# Patient Record
Sex: FEMALE | Race: WHITE | NOT HISPANIC OR LATINO
[De-identification: names, ages, dates, MRNs, and addresses within clinical notes are randomized per-mention and may not be internally consistent; named-entity substitution may affect disease eponyms.]

---

## 2019-09-05 ENCOUNTER — APPOINTMENT (OUTPATIENT)
Dept: MAMMOGRAPHY | Facility: CLINIC | Age: 49
End: 2019-09-05

## 2021-07-31 ENCOUNTER — APPOINTMENT (OUTPATIENT)
Dept: RADIOLOGY | Facility: CLINIC | Age: 51
End: 2021-07-31

## 2021-07-31 ENCOUNTER — APPOINTMENT (OUTPATIENT)
Dept: MAMMOGRAPHY | Facility: CLINIC | Age: 51
End: 2021-07-31
Payer: COMMERCIAL

## 2021-07-31 PROCEDURE — 77063 BREAST TOMOSYNTHESIS BI: CPT

## 2021-07-31 PROCEDURE — 77067 SCR MAMMO BI INCL CAD: CPT

## 2021-07-31 PROCEDURE — 77080 DXA BONE DENSITY AXIAL: CPT

## 2023-08-31 ENCOUNTER — APPOINTMENT (OUTPATIENT)
Dept: MAMMOGRAPHY | Facility: CLINIC | Age: 53
End: 2023-08-31
Payer: COMMERCIAL

## 2023-08-31 ENCOUNTER — APPOINTMENT (OUTPATIENT)
Dept: RADIOLOGY | Facility: CLINIC | Age: 53
End: 2023-08-31

## 2023-08-31 PROCEDURE — 77080 DXA BONE DENSITY AXIAL: CPT

## 2023-08-31 PROCEDURE — 77063 BREAST TOMOSYNTHESIS BI: CPT

## 2023-08-31 PROCEDURE — 77067 SCR MAMMO BI INCL CAD: CPT

## 2024-07-11 ENCOUNTER — APPOINTMENT (OUTPATIENT)
Dept: MRI IMAGING | Facility: CLINIC | Age: 54
End: 2024-07-11

## 2024-07-11 PROBLEM — Z00.00 ENCOUNTER FOR PREVENTIVE HEALTH EXAMINATION: Status: ACTIVE | Noted: 2024-07-11

## 2024-07-11 PROCEDURE — 73721 MRI JNT OF LWR EXTRE W/O DYE: CPT | Mod: LT

## 2024-09-17 ENCOUNTER — NON-APPOINTMENT (OUTPATIENT)
Age: 54
End: 2024-09-17

## 2024-09-17 ENCOUNTER — APPOINTMENT (OUTPATIENT)
Dept: OBGYN | Facility: CLINIC | Age: 54
End: 2024-09-17
Payer: COMMERCIAL

## 2024-09-17 VITALS
HEIGHT: 66 IN | SYSTOLIC BLOOD PRESSURE: 136 MMHG | WEIGHT: 228 LBS | DIASTOLIC BLOOD PRESSURE: 96 MMHG | BODY MASS INDEX: 36.64 KG/M2

## 2024-09-17 DIAGNOSIS — N94.10 UNSPECIFIED DYSPAREUNIA: ICD-10-CM

## 2024-09-17 DIAGNOSIS — Z78.9 OTHER SPECIFIED HEALTH STATUS: ICD-10-CM

## 2024-09-17 DIAGNOSIS — Z01.419 ENCOUNTER FOR GYNECOLOGICAL EXAMINATION (GENERAL) (ROUTINE) W/OUT ABNORMAL FINDINGS: ICD-10-CM

## 2024-09-17 PROCEDURE — 99202 OFFICE O/P NEW SF 15 MIN: CPT | Mod: 25

## 2024-09-17 PROCEDURE — 99386 PREV VISIT NEW AGE 40-64: CPT

## 2024-09-17 RX ORDER — ESTRADIOL 10 UG/1
10 TABLET, FILM COATED VAGINAL
Qty: 24 | Refills: 3 | Status: ACTIVE | COMMUNITY
Start: 2024-09-17 | End: 1900-01-01

## 2024-09-17 RX ORDER — PHENTERMINE HYDROCHLORIDE 37.5 MG/1
37.5 TABLET ORAL
Refills: 0 | Status: ACTIVE | COMMUNITY

## 2024-09-18 ENCOUNTER — TRANSCRIPTION ENCOUNTER (OUTPATIENT)
Age: 54
End: 2024-09-18

## 2024-09-18 LAB — HPV HIGH+LOW RISK DNA PNL CVX: NOT DETECTED

## 2024-09-18 NOTE — HISTORY OF PRESENT ILLNESS
[Patient reported mammogram was normal] : Patient reported mammogram was normal [Patient reported PAP Smear was normal] : Patient reported PAP Smear was normal [Patient reported bone density results were abnormal] : Patient reported bone density results were abnormal [HPV test offered] : HPV test offered [postmenopausal] : postmenopausal [Y] : Positive pregnancy history [TextBox_4] : 53 yo  for well woman exam today.  No hx abnormal pap smears, abnormal bleeding, fibroids, cysts.  Reports occasional urinary leakage.  Past medical, surgical, social and family hx reviewed.  Reports vaginal pain with intercourse at times.  Lubricant has not improved this sensation.  Hx osteopenia and is vigilant about calcium, vit D3 and weight-bearing exercise.  Mother has osteoporosis. [Mammogramdate] : 8/23 [PapSmeardate] : 8/23 [BoneDensityDate] : 8/23 [TextBox_37] : osteopenia [PGHxTotal] : 2 [[facility]] : 2 [[facility] : 2

## 2024-09-18 NOTE — DISCUSSION/SUMMARY
[FreeTextEntry1] : Unremarkable CBE and SBE reviewed Mammogram ordered Pelvic exam unremarkable Pap/HPV collected Rx Vagifem Encouraged to perform Kegel exercises regularly and given handout on same.  If no improvement in urinary leakage will refer to Dr. Monterroso I reviewed practices to support bone health including Vitamin D3 (2000 IU) and calcium rich foods with limitation on calcium supplementation by tabular form to 600 MG by mouth daily, a minimum of 30 minutes of weight bearing exercise at least 3 x weekly and limited daily sun exposure, 10-15 minutes.  Healthy diet, exercise and sleep hygiene discussed The importance of a screening colonoscopy was discussed and she does have a referral from her PCP

## 2024-09-18 NOTE — HISTORY OF PRESENT ILLNESS
[Patient reported mammogram was normal] : Patient reported mammogram was normal [Patient reported PAP Smear was normal] : Patient reported PAP Smear was normal [Patient reported bone density results were abnormal] : Patient reported bone density results were abnormal [HPV test offered] : HPV test offered [postmenopausal] : postmenopausal [Y] : Positive pregnancy history [TextBox_4] : 55 yo  for well woman exam today.  No hx abnormal pap smears, abnormal bleeding, fibroids, cysts.  Reports occasional urinary leakage.  Past medical, surgical, social and family hx reviewed.  Reports vaginal pain with intercourse at times.  Lubricant has not improved this sensation.  Hx osteopenia and is vigilant about calcium, vit D3 and weight-bearing exercise.  Mother has osteoporosis. [Mammogramdate] : 8/23 [PapSmeardate] : 8/23 [BoneDensityDate] : 8/23 [TextBox_37] : osteopenia [PGHxTotal] : 2 [[facility] 2 [[facility] 2

## 2024-09-26 LAB — CYTOLOGY CVX/VAG DOC THIN PREP: NORMAL

## 2024-10-04 ENCOUNTER — RESULT REVIEW (OUTPATIENT)
Age: 54
End: 2024-10-04

## 2024-10-04 ENCOUNTER — APPOINTMENT (OUTPATIENT)
Dept: MAMMOGRAPHY | Facility: CLINIC | Age: 54
End: 2024-10-04
Payer: COMMERCIAL

## 2024-10-04 PROCEDURE — 77063 BREAST TOMOSYNTHESIS BI: CPT

## 2024-10-04 PROCEDURE — 77067 SCR MAMMO BI INCL CAD: CPT

## 2025-09-02 ENCOUNTER — APPOINTMENT (OUTPATIENT)
Dept: RADIOLOGY | Facility: CLINIC | Age: 55
End: 2025-09-02
Payer: COMMERCIAL

## 2025-09-02 PROCEDURE — 77080 DXA BONE DENSITY AXIAL: CPT

## 2025-09-02 PROCEDURE — 72202 X-RAY EXAM SI JOINTS 3/> VWS: CPT
